# Patient Record
Sex: FEMALE | Race: BLACK OR AFRICAN AMERICAN | NOT HISPANIC OR LATINO | Employment: OTHER | URBAN - METROPOLITAN AREA
[De-identification: names, ages, dates, MRNs, and addresses within clinical notes are randomized per-mention and may not be internally consistent; named-entity substitution may affect disease eponyms.]

---

## 2024-03-16 ENCOUNTER — APPOINTMENT (OUTPATIENT)
Dept: RADIOLOGY | Facility: HOSPITAL | Age: 69
End: 2024-03-16
Payer: COMMERCIAL

## 2024-03-16 ENCOUNTER — HOSPITAL ENCOUNTER (EMERGENCY)
Facility: HOSPITAL | Age: 69
Discharge: OTHER INSTITUTION | End: 2024-03-16
Payer: COMMERCIAL

## 2024-03-16 VITALS
TEMPERATURE: 97.7 F | RESPIRATION RATE: 18 BRPM | OXYGEN SATURATION: 100 % | DIASTOLIC BLOOD PRESSURE: 73 MMHG | HEART RATE: 92 BPM | SYSTOLIC BLOOD PRESSURE: 120 MMHG

## 2024-03-16 DIAGNOSIS — L03.012 CELLULITIS OF LEFT THUMB: ICD-10-CM

## 2024-03-16 DIAGNOSIS — M79.645 THUMB PAIN, LEFT: Primary | ICD-10-CM

## 2024-03-16 LAB
ALBUMIN SERPL BCP-MCNC: 3.8 G/DL (ref 3.4–5)
ALP SERPL-CCNC: 81 U/L (ref 33–136)
ALT SERPL W P-5'-P-CCNC: 18 U/L (ref 7–45)
ANION GAP SERPL CALC-SCNC: 12 MMOL/L (ref 10–20)
AST SERPL W P-5'-P-CCNC: 22 U/L (ref 9–39)
BASOPHILS # BLD AUTO: 0.07 X10*3/UL (ref 0–0.1)
BASOPHILS NFR BLD AUTO: 0.7 %
BILIRUB SERPL-MCNC: 0.5 MG/DL (ref 0–1.2)
BUN SERPL-MCNC: 24 MG/DL (ref 6–23)
CALCIUM SERPL-MCNC: 9 MG/DL (ref 8.6–10.6)
CHLORIDE SERPL-SCNC: 116 MMOL/L (ref 98–107)
CO2 SERPL-SCNC: 18 MMOL/L (ref 21–32)
CREAT SERPL-MCNC: 1.3 MG/DL (ref 0.5–1.05)
CRP SERPL-MCNC: 1 MG/DL
EGFRCR SERPLBLD CKD-EPI 2021: 44 ML/MIN/1.73M*2
EOSINOPHIL # BLD AUTO: 0.13 X10*3/UL (ref 0–0.7)
EOSINOPHIL NFR BLD AUTO: 1.4 %
ERYTHROCYTE [DISTWIDTH] IN BLOOD BY AUTOMATED COUNT: 15.8 % (ref 11.5–14.5)
ERYTHROCYTE [SEDIMENTATION RATE] IN BLOOD BY WESTERGREN METHOD: 6 MM/H (ref 0–30)
GLUCOSE SERPL-MCNC: 98 MG/DL (ref 74–99)
HCT VFR BLD AUTO: 21.7 % (ref 36–46)
HGB BLD-MCNC: 7.7 G/DL (ref 12–16)
IMM GRANULOCYTES # BLD AUTO: 0.05 X10*3/UL (ref 0–0.7)
IMM GRANULOCYTES NFR BLD AUTO: 0.5 % (ref 0–0.9)
LYMPHOCYTES # BLD AUTO: 0.92 X10*3/UL (ref 1.2–4.8)
LYMPHOCYTES NFR BLD AUTO: 9.9 %
MAGNESIUM SERPL-MCNC: 1.84 MG/DL (ref 1.6–2.4)
MCH RBC QN AUTO: 30 PG (ref 26–34)
MCHC RBC AUTO-ENTMCNC: 35.5 G/DL (ref 32–36)
MCV RBC AUTO: 84 FL (ref 80–100)
MONOCYTES # BLD AUTO: 0.53 X10*3/UL (ref 0.1–1)
MONOCYTES NFR BLD AUTO: 5.7 %
NEUTROPHILS # BLD AUTO: 7.64 X10*3/UL (ref 1.2–7.7)
NEUTROPHILS NFR BLD AUTO: 81.8 %
NRBC BLD-RTO: 0 /100 WBCS (ref 0–0)
PLATELET # BLD AUTO: 319 X10*3/UL (ref 150–450)
POTASSIUM SERPL-SCNC: 5.1 MMOL/L (ref 3.5–5.3)
PROT SERPL-MCNC: 6.7 G/DL (ref 6.4–8.2)
RBC # BLD AUTO: 2.57 X10*6/UL (ref 4–5.2)
SODIUM SERPL-SCNC: 141 MMOL/L (ref 136–145)
WBC # BLD AUTO: 9.3 X10*3/UL (ref 4.4–11.3)

## 2024-03-16 PROCEDURE — 73130 X-RAY EXAM OF HAND: CPT | Mod: LEFT SIDE | Performed by: RADIOLOGY

## 2024-03-16 PROCEDURE — 83735 ASSAY OF MAGNESIUM: CPT

## 2024-03-16 PROCEDURE — 36415 COLL VENOUS BLD VENIPUNCTURE: CPT

## 2024-03-16 PROCEDURE — 99284 EMERGENCY DEPT VISIT MOD MDM: CPT

## 2024-03-16 PROCEDURE — 86140 C-REACTIVE PROTEIN: CPT

## 2024-03-16 PROCEDURE — 99283 EMERGENCY DEPT VISIT LOW MDM: CPT

## 2024-03-16 PROCEDURE — 85652 RBC SED RATE AUTOMATED: CPT

## 2024-03-16 PROCEDURE — 85025 COMPLETE CBC W/AUTO DIFF WBC: CPT

## 2024-03-16 PROCEDURE — 84075 ASSAY ALKALINE PHOSPHATASE: CPT

## 2024-03-16 PROCEDURE — 73130 X-RAY EXAM OF HAND: CPT | Mod: LT

## 2024-03-16 RX ORDER — ACETAMINOPHEN 325 MG/1
650 TABLET ORAL ONCE
Status: COMPLETED | OUTPATIENT
Start: 2024-03-16 | End: 2024-03-16

## 2024-03-16 RX ADMIN — ACETAMINOPHEN 650 MG: 325 TABLET ORAL at 11:01

## 2024-03-16 ASSESSMENT — COLUMBIA-SUICIDE SEVERITY RATING SCALE - C-SSRS
6. HAVE YOU EVER DONE ANYTHING, STARTED TO DO ANYTHING, OR PREPARED TO DO ANYTHING TO END YOUR LIFE?: NO
1. IN THE PAST MONTH, HAVE YOU WISHED YOU WERE DEAD OR WISHED YOU COULD GO TO SLEEP AND NOT WAKE UP?: NO
2. HAVE YOU ACTUALLY HAD ANY THOUGHTS OF KILLING YOURSELF?: NO

## 2024-03-16 NOTE — ED TRIAGE NOTES
Pt came to ED with c/o swelling in left thumb. Pt states it has been swollen x 1 week. Patient states there was a boil there and then she noticied the swelling. In triage patient's thumb is very swollen and red. Pt additionally reporting the start of swelling in right thumb. Hx Diabetes, treated with diet.

## 2024-03-16 NOTE — ED PROVIDER NOTES
HPI   Chief Complaint   Patient presents with   • Hand Pain       68-year-old female with history of HTN, DM, former CARMEN with EtOH and resultant alcoholic cirrhosis presents for chief complaint of left thumb swelling.  Denies injury.  Ongoing for 1 to 2 weeks.  Worsening now.  Endorses pain and erythema as well.  Had an abscess in the finger that was drained on its own a couple of days ago.  This thumb will not improved.  No fever chills or myalgia.  Plaints at this time.                          No data recorded                   Patient History   No past medical history on file.  No past surgical history on file.  No family history on file.  Social History     Tobacco Use   • Smoking status: Not on file   • Smokeless tobacco: Not on file   Substance Use Topics   • Alcohol use: Not on file   • Drug use: Not on file       Physical Exam   ED Triage Vitals [03/16/24 1021]   Temperature Heart Rate Respirations BP   36.5 °C (97.7 °F) 92 18 120/73      Pulse Ox Temp Source Heart Rate Source Patient Position   100 % Temporal -- --      BP Location FiO2 (%)     -- --       Physical Exam  Constitutional:       Appearance: Normal appearance.   HENT:      Head: Normocephalic and atraumatic.      Mouth/Throat:      Mouth: Mucous membranes are moist.      Pharynx: Oropharynx is clear.   Eyes:      Extraocular Movements: Extraocular movements intact.      Conjunctiva/sclera: Conjunctivae normal.      Pupils: Pupils are equal, round, and reactive to light.   Cardiovascular:      Rate and Rhythm: Normal rate and regular rhythm.      Pulses: Normal pulses.      Heart sounds: Normal heart sounds.   Pulmonary:      Effort: Pulmonary effort is normal.      Breath sounds: Normal breath sounds.   Abdominal:      General: Abdomen is flat.      Palpations: Abdomen is soft.   Musculoskeletal:         General: Normal range of motion.      Cervical back: Normal range of motion and neck supple.      Comments: Redness with swelling of the  entire first digit on the left hand with tenderness on palpation.  Entire digit is swollen but not held in passive flexion.  Pain on extension and also flexor tendon palpation.  Cap refill less than 2 seconds.  MSPs intact in all extremities.  No streaking.   Skin:     General: Skin is warm and dry.      Capillary Refill: Capillary refill takes less than 2 seconds.   Neurological:      General: No focal deficit present.      Mental Status: She is alert and oriented to person, place, and time.   Psychiatric:         Mood and Affect: Mood normal.         Behavior: Behavior normal.         Thought Content: Thought content normal.         Judgment: Judgment normal.         ED Course & MDM   Diagnoses as of 03/16/24 1314   Thumb pain, left   Cellulitis of left thumb       Medical Decision Making  Vital signs reviewed, unremarkable at this time.  Patient is well-appearing and in no apparent distress.  Speaks full sentences without difficulty.  Diagnostic testing performed.  With these findings it is concerning for possible flexor tenosynovitis.  X-rays and labs were performed and Tylenol was given for pain control to start.  Labs showed no acute findings.  X-ray showed marked soft tissue swelling in the first interphalangeal joint on the left side concerning for cellulitis.  Lucency at the dorsal lateral base of the first distal phalanx may be infectious or degenerative.  Osteomyelitis and first interphalangeal joint septic arthritis are not excluded.  I promptly tried to find the patient.  Multiple attempts were made to find her from the lobby.  However no answer.  I called all 3 of her phone numbers.  The first 2 were disconnected and the third 1 was the wrong number.  I checked the radiology waiting area as well.  I was unable to locate the patient over the course of multiple attempts spanning at least an hour.  Unfortunately this patient appears to have left without treatment complete.  Should she return, antibiotics  and Ortho consult should be considered.        Procedure  Procedures     Robb Guerra, APRN-CNP  03/16/24 6363

## 2024-03-17 ENCOUNTER — HOSPITAL ENCOUNTER (OUTPATIENT)
Facility: HOSPITAL | Age: 69
Setting detail: OBSERVATION
Discharge: HOME | End: 2024-03-18
Attending: EMERGENCY MEDICINE | Admitting: STUDENT IN AN ORGANIZED HEALTH CARE EDUCATION/TRAINING PROGRAM
Payer: COMMERCIAL

## 2024-03-17 ENCOUNTER — HOSPITAL ENCOUNTER (EMERGENCY)
Facility: HOSPITAL | Age: 69
Discharge: STILL ADMITTED OR ADMINISTRATIVE | End: 2024-03-17
Payer: COMMERCIAL

## 2024-03-17 ENCOUNTER — CLINICAL SUPPORT (OUTPATIENT)
Dept: EMERGENCY MEDICINE | Facility: HOSPITAL | Age: 69
End: 2024-03-17
Payer: COMMERCIAL

## 2024-03-17 DIAGNOSIS — E87.5 HYPERKALEMIA: ICD-10-CM

## 2024-03-17 DIAGNOSIS — L08.9 FINGER INFECTION: Primary | ICD-10-CM

## 2024-03-17 DIAGNOSIS — M10.9 ACUTE GOUT OF LEFT HAND, UNSPECIFIED CAUSE: ICD-10-CM

## 2024-03-17 DIAGNOSIS — E13.69 OTHER SPECIFIED DIABETES MELLITUS WITH OTHER SPECIFIED COMPLICATION, WITHOUT LONG-TERM CURRENT USE OF INSULIN (MULTI): ICD-10-CM

## 2024-03-17 DIAGNOSIS — I15.9 SECONDARY HYPERTENSION: ICD-10-CM

## 2024-03-17 LAB
ABO GROUP (TYPE) IN BLOOD: NORMAL
ALBUMIN SERPL BCP-MCNC: 3.9 G/DL (ref 3.4–5)
ALP SERPL-CCNC: 92 U/L (ref 33–136)
ALT SERPL W P-5'-P-CCNC: 17 U/L (ref 7–45)
ANION GAP SERPL CALC-SCNC: 14 MMOL/L (ref 10–20)
ANTIBODY SCREEN: NORMAL
AST SERPL W P-5'-P-CCNC: 21 U/L (ref 9–39)
ATRIAL RATE: 84 BPM
BASOPHILS # BLD AUTO: 0.05 X10*3/UL (ref 0–0.1)
BASOPHILS NFR BLD AUTO: 0.6 %
BILIRUB SERPL-MCNC: 0.4 MG/DL (ref 0–1.2)
BUN SERPL-MCNC: 33 MG/DL (ref 6–23)
CALCIUM SERPL-MCNC: 8.8 MG/DL (ref 8.6–10.6)
CHLORIDE SERPL-SCNC: 110 MMOL/L (ref 98–107)
CO2 SERPL-SCNC: 15 MMOL/L (ref 21–32)
CREAT SERPL-MCNC: 1.42 MG/DL (ref 0.5–1.05)
CRP SERPL-MCNC: 0.62 MG/DL
EGFRCR SERPLBLD CKD-EPI 2021: 40 ML/MIN/1.73M*2
EOSINOPHIL # BLD AUTO: 0.19 X10*3/UL (ref 0–0.7)
EOSINOPHIL NFR BLD AUTO: 2.3 %
ERYTHROCYTE [DISTWIDTH] IN BLOOD BY AUTOMATED COUNT: 16.2 % (ref 11.5–14.5)
ERYTHROCYTE [SEDIMENTATION RATE] IN BLOOD BY WESTERGREN METHOD: 3 MM/H (ref 0–30)
GLUCOSE BLD MANUAL STRIP-MCNC: 88 MG/DL (ref 74–99)
GLUCOSE SERPL-MCNC: 84 MG/DL (ref 74–99)
HCT VFR BLD AUTO: 22.8 % (ref 36–46)
HGB BLD-MCNC: 7.5 G/DL (ref 12–16)
IMM GRANULOCYTES # BLD AUTO: 0.07 X10*3/UL (ref 0–0.7)
IMM GRANULOCYTES NFR BLD AUTO: 0.8 % (ref 0–0.9)
INR PPP: 1 (ref 0.9–1.1)
LYMPHOCYTES # BLD AUTO: 1.34 X10*3/UL (ref 1.2–4.8)
LYMPHOCYTES NFR BLD AUTO: 16.2 %
MCH RBC QN AUTO: 29.4 PG (ref 26–34)
MCHC RBC AUTO-ENTMCNC: 32.9 G/DL (ref 32–36)
MCV RBC AUTO: 89 FL (ref 80–100)
MONOCYTES # BLD AUTO: 0.46 X10*3/UL (ref 0.1–1)
MONOCYTES NFR BLD AUTO: 5.5 %
NEUTROPHILS # BLD AUTO: 6.18 X10*3/UL (ref 1.2–7.7)
NEUTROPHILS NFR BLD AUTO: 74.6 %
NRBC BLD-RTO: 0 /100 WBCS (ref 0–0)
P AXIS: 58 DEGREES
P OFFSET: 186 MS
P ONSET: 137 MS
PLATELET # BLD AUTO: 345 X10*3/UL (ref 150–450)
POTASSIUM SERPL-SCNC: 4.4 MMOL/L (ref 3.5–5.3)
POTASSIUM SERPL-SCNC: 5.9 MMOL/L (ref 3.5–5.3)
PR INTERVAL: 180 MS
PROT SERPL-MCNC: 6.7 G/DL (ref 6.4–8.2)
PROTHROMBIN TIME: 11.2 SECONDS (ref 9.8–12.8)
Q ONSET: 227 MS
QRS COUNT: 14 BEATS
QRS DURATION: 74 MS
QT INTERVAL: 368 MS
QTC CALCULATION(BAZETT): 434 MS
QTC FREDERICIA: 411 MS
R AXIS: -3 DEGREES
RBC # BLD AUTO: 2.55 X10*6/UL (ref 4–5.2)
RH FACTOR (ANTIGEN D): NORMAL
SODIUM SERPL-SCNC: 133 MMOL/L (ref 136–145)
T AXIS: 55 DEGREES
T OFFSET: 411 MS
VENTRICULAR RATE: 84 BPM
WBC # BLD AUTO: 8.3 X10*3/UL (ref 4.4–11.3)

## 2024-03-17 PROCEDURE — 81003 URINALYSIS AUTO W/O SCOPE: CPT

## 2024-03-17 PROCEDURE — 84132 ASSAY OF SERUM POTASSIUM: CPT | Mod: 59 | Performed by: PHYSICIAN ASSISTANT

## 2024-03-17 PROCEDURE — 94644 CONT INHLJ TX 1ST HOUR: CPT

## 2024-03-17 PROCEDURE — 2500000002 HC RX 250 W HCPCS SELF ADMINISTERED DRUGS (ALT 637 FOR MEDICARE OP, ALT 636 FOR OP/ED): Performed by: EMERGENCY MEDICINE

## 2024-03-17 PROCEDURE — 36415 COLL VENOUS BLD VENIPUNCTURE: CPT | Performed by: PHYSICIAN ASSISTANT

## 2024-03-17 PROCEDURE — 99285 EMERGENCY DEPT VISIT HI MDM: CPT | Mod: 25 | Performed by: EMERGENCY MEDICINE

## 2024-03-17 PROCEDURE — 96365 THER/PROPH/DIAG IV INF INIT: CPT | Mod: 59 | Performed by: STUDENT IN AN ORGANIZED HEALTH CARE EDUCATION/TRAINING PROGRAM

## 2024-03-17 PROCEDURE — 85025 COMPLETE CBC W/AUTO DIFF WBC: CPT | Performed by: PHYSICIAN ASSISTANT

## 2024-03-17 PROCEDURE — 83540 ASSAY OF IRON: CPT

## 2024-03-17 PROCEDURE — 2500000002 HC RX 250 W HCPCS SELF ADMINISTERED DRUGS (ALT 637 FOR MEDICARE OP, ALT 636 FOR OP/ED): Performed by: PHYSICIAN ASSISTANT

## 2024-03-17 PROCEDURE — 4500999001 HC ED NO CHARGE

## 2024-03-17 PROCEDURE — 82436 ASSAY OF URINE CHLORIDE: CPT

## 2024-03-17 PROCEDURE — 96367 TX/PROPH/DG ADDL SEQ IV INF: CPT | Performed by: STUDENT IN AN ORGANIZED HEALTH CARE EDUCATION/TRAINING PROGRAM

## 2024-03-17 PROCEDURE — G0378 HOSPITAL OBSERVATION PER HR: HCPCS

## 2024-03-17 PROCEDURE — 84550 ASSAY OF BLOOD/URIC ACID: CPT

## 2024-03-17 PROCEDURE — 2500000005 HC RX 250 GENERAL PHARMACY W/O HCPCS: Performed by: PHYSICIAN ASSISTANT

## 2024-03-17 PROCEDURE — 93005 ELECTROCARDIOGRAM TRACING: CPT

## 2024-03-17 PROCEDURE — 85652 RBC SED RATE AUTOMATED: CPT | Performed by: PHYSICIAN ASSISTANT

## 2024-03-17 PROCEDURE — 86140 C-REACTIVE PROTEIN: CPT | Performed by: PHYSICIAN ASSISTANT

## 2024-03-17 PROCEDURE — 96375 TX/PRO/DX INJ NEW DRUG ADDON: CPT | Performed by: STUDENT IN AN ORGANIZED HEALTH CARE EDUCATION/TRAINING PROGRAM

## 2024-03-17 PROCEDURE — 85610 PROTHROMBIN TIME: CPT | Performed by: PHYSICIAN ASSISTANT

## 2024-03-17 PROCEDURE — 82728 ASSAY OF FERRITIN: CPT

## 2024-03-17 PROCEDURE — 10140 I&D HMTMA SEROMA/FLUID COLLJ: CPT

## 2024-03-17 PROCEDURE — 2500000001 HC RX 250 WO HCPCS SELF ADMINISTERED DRUGS (ALT 637 FOR MEDICARE OP): Performed by: PHYSICIAN ASSISTANT

## 2024-03-17 PROCEDURE — 80053 COMPREHEN METABOLIC PANEL: CPT | Performed by: PHYSICIAN ASSISTANT

## 2024-03-17 PROCEDURE — 86900 BLOOD TYPING SEROLOGIC ABO: CPT | Performed by: PHYSICIAN ASSISTANT

## 2024-03-17 PROCEDURE — 86901 BLOOD TYPING SEROLOGIC RH(D): CPT | Performed by: PHYSICIAN ASSISTANT

## 2024-03-17 PROCEDURE — 2500000004 HC RX 250 GENERAL PHARMACY W/ HCPCS (ALT 636 FOR OP/ED): Performed by: PHYSICIAN ASSISTANT

## 2024-03-17 PROCEDURE — 82947 ASSAY GLUCOSE BLOOD QUANT: CPT

## 2024-03-17 PROCEDURE — 99285 EMERGENCY DEPT VISIT HI MDM: CPT | Performed by: PHYSICIAN ASSISTANT

## 2024-03-17 PROCEDURE — 94645 CONT INHLJ TX EACH ADDL HOUR: CPT

## 2024-03-17 RX ORDER — LANOLIN ALCOHOL/MO/W.PET/CERES
1000 CREAM (GRAM) TOPICAL
COMMUNITY
Start: 2024-03-01 | End: 2025-03-01

## 2024-03-17 RX ORDER — ALBUTEROL SULFATE 0.83 MG/ML
10 SOLUTION RESPIRATORY (INHALATION) ONCE
Status: COMPLETED | OUTPATIENT
Start: 2024-03-17 | End: 2024-03-17

## 2024-03-17 RX ORDER — INDOMETHACIN 25 MG/1
50 CAPSULE ORAL ONCE
Status: COMPLETED | OUTPATIENT
Start: 2024-03-17 | End: 2024-03-17

## 2024-03-17 RX ORDER — LIDOCAINE HYDROCHLORIDE 10 MG/ML
5 INJECTION INFILTRATION; PERINEURAL ONCE
Status: COMPLETED | OUTPATIENT
Start: 2024-03-17 | End: 2024-03-17

## 2024-03-17 RX ORDER — HEPARIN SODIUM 5000 [USP'U]/ML
5000 INJECTION, SOLUTION INTRAVENOUS; SUBCUTANEOUS EVERY 8 HOURS
Status: DISCONTINUED | OUTPATIENT
Start: 2024-03-17 | End: 2024-03-18 | Stop reason: HOSPADM

## 2024-03-17 RX ORDER — CALCIUM GLUCONATE 20 MG/ML
2 INJECTION, SOLUTION INTRAVENOUS ONCE
Status: COMPLETED | OUTPATIENT
Start: 2024-03-17 | End: 2024-03-17

## 2024-03-17 RX ORDER — CEPHALEXIN 500 MG/1
500 CAPSULE ORAL 4 TIMES DAILY
Qty: 28 CAPSULE | Refills: 0 | Status: SHIPPED | OUTPATIENT
Start: 2024-03-17 | End: 2024-03-24

## 2024-03-17 RX ORDER — COLCHICINE 0.6 MG/1
1.2 TABLET ORAL ONCE
Status: COMPLETED | OUTPATIENT
Start: 2024-03-17 | End: 2024-03-17

## 2024-03-17 RX ORDER — METOPROLOL TARTRATE 25 MG/1
0.5 TABLET, FILM COATED ORAL 2 TIMES DAILY
COMMUNITY
Start: 2024-03-01 | End: 2024-03-18 | Stop reason: SDUPTHER

## 2024-03-17 RX ORDER — ACETAMINOPHEN 325 MG/1
975 TABLET ORAL ONCE
Status: COMPLETED | OUTPATIENT
Start: 2024-03-17 | End: 2024-03-17

## 2024-03-17 RX ORDER — CHOLECALCIFEROL (VITAMIN D3) 50 MCG
2000 TABLET ORAL
COMMUNITY
Start: 2024-03-05 | End: 2024-03-18 | Stop reason: SDUPTHER

## 2024-03-17 RX ORDER — FAMOTIDINE 20 MG/1
20 TABLET, FILM COATED ORAL 2 TIMES DAILY
COMMUNITY
Start: 2018-02-25 | End: 2024-03-18 | Stop reason: ALTCHOICE

## 2024-03-17 RX ORDER — ASPIRIN 325 MG
50000 TABLET, DELAYED RELEASE (ENTERIC COATED) ORAL WEEKLY
COMMUNITY
End: 2024-03-18 | Stop reason: ALTCHOICE

## 2024-03-17 RX ORDER — VANCOMYCIN HYDROCHLORIDE 750 MG/150ML
20 INJECTION, SOLUTION INTRAVENOUS ONCE
Status: COMPLETED | OUTPATIENT
Start: 2024-03-17 | End: 2024-03-17

## 2024-03-17 RX ORDER — LIDOCAINE 50 MG/G
1 PATCH TOPICAL DAILY
COMMUNITY
Start: 2018-06-08 | End: 2024-03-18 | Stop reason: SDUPTHER

## 2024-03-17 RX ORDER — FUROSEMIDE 20 MG/1
20 TABLET ORAL
COMMUNITY
Start: 2024-03-01 | End: 2024-03-18 | Stop reason: SDUPTHER

## 2024-03-17 RX ORDER — DEXTROSE 50 % IN WATER (D50W) INTRAVENOUS SYRINGE
25 ONCE
Status: COMPLETED | OUTPATIENT
Start: 2024-03-17 | End: 2024-03-17

## 2024-03-17 RX ORDER — COLCHICINE 0.6 MG/1
0.6 TABLET ORAL 2 TIMES DAILY
Qty: 28 TABLET | Refills: 0 | Status: SHIPPED | OUTPATIENT
Start: 2024-03-17 | End: 2024-03-31

## 2024-03-17 RX ORDER — POLYETHYLENE GLYCOL 3350 17 G/17G
17 POWDER, FOR SOLUTION ORAL DAILY
Status: DISCONTINUED | OUTPATIENT
Start: 2024-03-18 | End: 2024-03-18 | Stop reason: HOSPADM

## 2024-03-17 RX ADMIN — VANCOMYCIN HYDROCHLORIDE 1500 MG: 750 INJECTION, SOLUTION INTRAVENOUS at 18:03

## 2024-03-17 RX ADMIN — AMPICILLIN SODIUM AND SULBACTAM SODIUM 3 G: 2; 1 INJECTION, POWDER, FOR SOLUTION INTRAMUSCULAR; INTRAVENOUS at 17:16

## 2024-03-17 RX ADMIN — INSULIN HUMAN 5 UNITS: 100 INJECTION, SOLUTION PARENTERAL at 18:49

## 2024-03-17 RX ADMIN — AMPICILLIN SODIUM AND SULBACTAM SODIUM 3 G: 2; 1 INJECTION, POWDER, FOR SOLUTION INTRAMUSCULAR; INTRAVENOUS at 22:41

## 2024-03-17 RX ADMIN — ACETAMINOPHEN 975 MG: 325 TABLET ORAL at 19:29

## 2024-03-17 RX ADMIN — LIDOCAINE HYDROCHLORIDE 50 MG: 10 INJECTION, SOLUTION INFILTRATION; PERINEURAL at 16:09

## 2024-03-17 RX ADMIN — COLCHICINE 1.2 MG: 0.6 TABLET, FILM COATED ORAL at 19:29

## 2024-03-17 RX ADMIN — SODIUM BICARBONATE 50 MEQ: 84 INJECTION, SOLUTION INTRAVENOUS at 18:49

## 2024-03-17 RX ADMIN — ALBUTEROL SULFATE 10 MG: 2.5 SOLUTION RESPIRATORY (INHALATION) at 18:50

## 2024-03-17 RX ADMIN — CALCIUM GLUCONATE 2 G: 20 INJECTION, SOLUTION INTRAVENOUS at 19:02

## 2024-03-17 RX ADMIN — DEXTROSE MONOHYDRATE 25 G: 25 INJECTION, SOLUTION INTRAVENOUS at 18:48

## 2024-03-17 RX ADMIN — SODIUM ZIRCONIUM CYCLOSILICATE 10 G: 10 POWDER, FOR SUSPENSION ORAL at 19:29

## 2024-03-17 ASSESSMENT — COLUMBIA-SUICIDE SEVERITY RATING SCALE - C-SSRS
2. HAVE YOU ACTUALLY HAD ANY THOUGHTS OF KILLING YOURSELF?: NO
6. HAVE YOU EVER DONE ANYTHING, STARTED TO DO ANYTHING, OR PREPARED TO DO ANYTHING TO END YOUR LIFE?: NO
1. IN THE PAST MONTH, HAVE YOU WISHED YOU WERE DEAD OR WISHED YOU COULD GO TO SLEEP AND NOT WAKE UP?: NO

## 2024-03-17 ASSESSMENT — PAIN - FUNCTIONAL ASSESSMENT: PAIN_FUNCTIONAL_ASSESSMENT: 0-10

## 2024-03-17 ASSESSMENT — PAIN SCALES - GENERAL: PAINLEVEL_OUTOF10: 5 - MODERATE PAIN

## 2024-03-17 NOTE — DISCHARGE INSTRUCTIONS
Take the colchicine for the gout, take the Keflex for the infection overlying the gout.  You need to follow-up with a PCP as soon as possible for follow-up for the gout causing swelling of your finger.  If you have not heard from them call the number listed below.  Alternatively seeing a rheumatologist is beneficial for gout.  Need to follow-up with your PCP about your abnormal kidney function as well as your anemia and your electrolyte abnormalities

## 2024-03-17 NOTE — ED PROVIDER NOTES
HPI:  68-year-old female with history of HTN, DM no longer on meds, former substance use and alcohol use disorder presenting for left thumb swelling.  States it is minimally painful.  Was seen in the ED yesterday and had laboratory work and x-ray performed however left prior to treatment complete and there was no working phone number to contact the patient at.  States she left with the IV in place and returns today to have the IV taken out and would also like evaluated for her thumb.  She denies any change in her thumb since yesterday.  Denies any fall or any other injury.  States been swollen for 1 to 2 weeks and getting worse.  Also has some pain in her right first and second digits.  She denies any fevers chills night sweats or rigors.  Has not been to her doctor regular.  Denies tobacco or drug use, states she does drink on occasion however unclear if she has relapsed    Physical Exam:   GEN: Vitals noted. NAD, well-appearing able to comfortably  EYES:  EOMs grossly intact, anicteric sclera  JUNAID: Mucosa moist.  NECK: Supple.  CARD: RRR  PULMONARY: Moving air well. Clear all lung fields.  ABDOMEN: Soft, no guarding, no rigidity. Nontender. NABS  EXTREMITIES: Full ROM, no pitting edema, left thumb on the dorsal aspect with diffuse swelling of the distal phalanx just proximal to the nailbed with a small open lesion with purulence, diffusely tender, intact capillary fill with intact range of motion and sensation  SKIN: Intact, warm and dry  NEURO: Alert and oriented x 3, speech is clear, no obvious deficits noted.     EKG ED interpretation:  Sinus rhythm rate of 84, normal axis, normal normals, no ST segment change  ----------------------------------------------------------------------------------------------------------------------------    MDM:  68-year-old female presenting for left thumb pain and swelling x 2 weeks.  On exam she is well-appearing able to comfortably.  Vital signs stable.  Does have obvious signs  of cellulitis infection with fluid collection in the dorsal left thumb.  Evaluation from yesterday revealed no leukocytosis, anemia with H&H at 7.7/21.7, (CBC hemoglobin 11/23 8.9, 11/20 12.2); X-ray revealed diffuse swelling with lucency at the base of the distal phalanx.  Patient discussed with ED attending.  1% lidocaine without epinephrine instilled in the webspace of the first digit MCP for digital block successfully.  #11 scalpel utilized to make a approximately 4 mm linear incision at the point of maximal swelling.  Digital pressure was applied which did bring minimal purulence however large amount of bloody drainage.  After no further pressure the skin did swell again seemingly full again with most likely bloody drainage.  At this point hand surgery was consulted, will check laboratory work to see any change for him yesterday, will order MRI as recommended by radiologist from x-rays yesterday.  Spoke with Ortho hand who will see her bedside.  Kidney function with mildly elevated creatinine currently 1.42, on review of Care Everywhere she has had uptrending creatinine 3/1 1.27, 2/6 1.2, more distant past creatinine more elevated than today with 7/19/2021 at 1.57.  Potassium elevated at 5.9 without hemolysis, will perform EKG and provide hyperkalemia cocktail.  Does have anemia, minimal change from yesterday, downtrending from November.  Few years ago did have normal hemoglobin.  I did ask patient of this and she denies any known source of bleeding including any bloody, black or tarry stools.  She is told to follow-up with her PCP about this.  At the end my shift awaiting potassium recheck after hyperkalemia cocktail.  Signout given to oncoming ZEV.      ED Course as of 03/17/24 1836   Sun Mar 17, 2024   1705 HEMOGLOBIN(!): 7.5  Minimal change from yesterday [MIRIAN]   1706 WBC: 8.3  No leukocytosis currently [MIRIAN]   1810 POTASSIUM(!): 5.9  Without hemolysis, will give hyperkalemia cocktail [MIRIAN]      ED Course User  Index  [MIRIAN] Matty Apodaca PA-C         Diagnoses as of 03/17/24 1836   Finger infection   Acute gout of left hand, unspecified cause     MR hand left w and wo IV contrast    (Results Pending)     Labs Reviewed   CBC WITH AUTO DIFFERENTIAL   COMPREHENSIVE METABOLIC PANEL   SEDIMENTATION RATE, AUTOMATED   C-REACTIVE PROTEIN   PROTIME-INR   TYPE AND SCREEN       ----------------------------------------------------------------------------------------------------------------------------    This note was dictated using a speech recognition program.  While an attempt was made at proof reading to minimize errors, minor errors in transcription may be present call for questions.     Matty Apodaca PA-C  03/17/24 1833

## 2024-03-18 VITALS
HEART RATE: 74 BPM | DIASTOLIC BLOOD PRESSURE: 76 MMHG | TEMPERATURE: 97.5 F | WEIGHT: 175 LBS | OXYGEN SATURATION: 99 % | RESPIRATION RATE: 15 BRPM | SYSTOLIC BLOOD PRESSURE: 122 MMHG

## 2024-03-18 LAB
ANION GAP SERPL CALC-SCNC: 12 MMOL/L (ref 10–20)
APPEARANCE UR: CLEAR
BASOPHILS # BLD AUTO: 0.02 X10*3/UL (ref 0–0.1)
BASOPHILS NFR BLD AUTO: 0.3 %
BILIRUB UR STRIP.AUTO-MCNC: NEGATIVE MG/DL
BUN SERPL-MCNC: 32 MG/DL (ref 6–23)
CALCIUM SERPL-MCNC: 8.1 MG/DL (ref 8.6–10.6)
CHLORIDE SERPL-SCNC: 115 MMOL/L (ref 98–107)
CHLORIDE UR-SCNC: 86 MMOL/L
CHLORIDE/CREATININE (MMOL/G) IN URINE: 152 MMOL/G CREAT (ref 38–318)
CO2 SERPL-SCNC: 17 MMOL/L (ref 21–32)
COLOR UR: NORMAL
CREAT SERPL-MCNC: 1.07 MG/DL (ref 0.5–1.05)
CREAT UR-MCNC: 56.6 MG/DL (ref 20–320)
EGFRCR SERPLBLD CKD-EPI 2021: 56 ML/MIN/1.73M*2
EOSINOPHIL # BLD AUTO: 0.09 X10*3/UL (ref 0–0.7)
EOSINOPHIL NFR BLD AUTO: 1.4 %
ERYTHROCYTE [DISTWIDTH] IN BLOOD BY AUTOMATED COUNT: 15.5 % (ref 11.5–14.5)
FERRITIN SERPL-MCNC: 631 NG/ML (ref 8–150)
GLUCOSE SERPL-MCNC: 68 MG/DL (ref 74–99)
GLUCOSE UR STRIP.AUTO-MCNC: NORMAL MG/DL
HCT VFR BLD AUTO: 18.1 % (ref 36–46)
HGB BLD-MCNC: 6.5 G/DL (ref 12–16)
HOLD SPECIMEN: NORMAL
IMM GRANULOCYTES # BLD AUTO: 0.06 X10*3/UL (ref 0–0.7)
IMM GRANULOCYTES NFR BLD AUTO: 0.9 % (ref 0–0.9)
IRON SATN MFR SERPL: 15 % (ref 25–45)
IRON SERPL-MCNC: 34 UG/DL (ref 35–150)
KETONES UR STRIP.AUTO-MCNC: NEGATIVE MG/DL
LEUKOCYTE ESTERASE UR QL STRIP.AUTO: NEGATIVE
LYMPHOCYTES # BLD AUTO: 1.05 X10*3/UL (ref 1.2–4.8)
LYMPHOCYTES NFR BLD AUTO: 16.4 %
MAGNESIUM SERPL-MCNC: 1.61 MG/DL (ref 1.6–2.4)
MCH RBC QN AUTO: 29.8 PG (ref 26–34)
MCHC RBC AUTO-ENTMCNC: 35.9 G/DL (ref 32–36)
MCV RBC AUTO: 83 FL (ref 80–100)
MONOCYTES # BLD AUTO: 0.44 X10*3/UL (ref 0.1–1)
MONOCYTES NFR BLD AUTO: 6.9 %
NEUTROPHILS # BLD AUTO: 4.73 X10*3/UL (ref 1.2–7.7)
NEUTROPHILS NFR BLD AUTO: 74.1 %
NITRITE UR QL STRIP.AUTO: NEGATIVE
NRBC BLD-RTO: 0 /100 WBCS (ref 0–0)
PH UR STRIP.AUTO: 5 [PH]
PLATELET # BLD AUTO: 277 X10*3/UL (ref 150–450)
POTASSIUM SERPL-SCNC: 4.7 MMOL/L (ref 3.5–5.3)
POTASSIUM UR-SCNC: 28 MMOL/L
POTASSIUM/CREAT UR-RTO: 49 MMOL/G CREAT
PROT UR STRIP.AUTO-MCNC: NEGATIVE MG/DL
RBC # BLD AUTO: 2.18 X10*6/UL (ref 4–5.2)
RBC # UR STRIP.AUTO: NEGATIVE /UL
SODIUM SERPL-SCNC: 139 MMOL/L (ref 136–145)
SODIUM UR-SCNC: 90 MMOL/L
SODIUM/CREAT UR-RTO: 159 MMOL/G CREAT
SP GR UR STRIP.AUTO: 1.01
TIBC SERPL-MCNC: 232 UG/DL (ref 240–445)
UIBC SERPL-MCNC: 198 UG/DL (ref 110–370)
URATE SERPL-MCNC: 11.8 MG/DL (ref 2.3–6.7)
UROBILINOGEN UR STRIP.AUTO-MCNC: NORMAL MG/DL
WBC # BLD AUTO: 6.4 X10*3/UL (ref 4.4–11.3)

## 2024-03-18 PROCEDURE — 80048 BASIC METABOLIC PNL TOTAL CA: CPT

## 2024-03-18 PROCEDURE — 2500000004 HC RX 250 GENERAL PHARMACY W/ HCPCS (ALT 636 FOR OP/ED): Performed by: PHYSICIAN ASSISTANT

## 2024-03-18 PROCEDURE — 2500000001 HC RX 250 WO HCPCS SELF ADMINISTERED DRUGS (ALT 637 FOR MEDICARE OP)

## 2024-03-18 PROCEDURE — G0378 HOSPITAL OBSERVATION PER HR: HCPCS

## 2024-03-18 PROCEDURE — 83735 ASSAY OF MAGNESIUM: CPT

## 2024-03-18 PROCEDURE — 36415 COLL VENOUS BLD VENIPUNCTURE: CPT

## 2024-03-18 PROCEDURE — 99239 HOSP IP/OBS DSCHRG MGMT >30: CPT | Performed by: STUDENT IN AN ORGANIZED HEALTH CARE EDUCATION/TRAINING PROGRAM

## 2024-03-18 PROCEDURE — 85025 COMPLETE CBC W/AUTO DIFF WBC: CPT

## 2024-03-18 PROCEDURE — 2500000004 HC RX 250 GENERAL PHARMACY W/ HCPCS (ALT 636 FOR OP/ED)

## 2024-03-18 PROCEDURE — 96361 HYDRATE IV INFUSION ADD-ON: CPT | Performed by: STUDENT IN AN ORGANIZED HEALTH CARE EDUCATION/TRAINING PROGRAM

## 2024-03-18 RX ORDER — ASCORBIC ACID 500 MG
500 TABLET ORAL DAILY
COMMUNITY
End: 2024-03-18 | Stop reason: SDUPTHER

## 2024-03-18 RX ORDER — FUROSEMIDE 40 MG/1
20 TABLET ORAL DAILY
Status: DISCONTINUED | OUTPATIENT
Start: 2024-03-18 | End: 2024-03-18 | Stop reason: HOSPADM

## 2024-03-18 RX ORDER — ASCORBIC ACID 500 MG
500 TABLET ORAL DAILY
Qty: 30 TABLET | Refills: 0 | Status: SHIPPED | OUTPATIENT
Start: 2024-03-18 | End: 2024-04-17

## 2024-03-18 RX ORDER — CHOLECALCIFEROL (VITAMIN D3) 50 MCG
2000 TABLET ORAL
Qty: 30 TABLET | Refills: 0 | Status: SHIPPED | OUTPATIENT
Start: 2024-03-18 | End: 2024-04-17

## 2024-03-18 RX ORDER — METOPROLOL TARTRATE 25 MG/1
12.5 TABLET, FILM COATED ORAL 2 TIMES DAILY
Qty: 30 TABLET | Refills: 0 | Status: SHIPPED | OUTPATIENT
Start: 2024-03-18 | End: 2024-04-17

## 2024-03-18 RX ORDER — LANOLIN ALCOHOL/MO/W.PET/CERES
100 CREAM (GRAM) TOPICAL
Qty: 30 TABLET | Refills: 0 | Status: SHIPPED | OUTPATIENT
Start: 2024-03-18 | End: 2024-04-17

## 2024-03-18 RX ORDER — MULTIVIT-MIN/IRON FUM/FOLIC AC 7.5 MG-4
1 TABLET ORAL DAILY
Qty: 30 TABLET | Refills: 0 | Status: SHIPPED | OUTPATIENT
Start: 2024-03-18 | End: 2024-04-17

## 2024-03-18 RX ORDER — ACETAMINOPHEN 500 MG
1000 TABLET ORAL EVERY 6 HOURS PRN
Qty: 30 TABLET | Refills: 0 | Status: SHIPPED | OUTPATIENT
Start: 2024-03-18

## 2024-03-18 RX ORDER — LANOLIN ALCOHOL/MO/W.PET/CERES
1000 CREAM (GRAM) TOPICAL DAILY
Status: DISCONTINUED | OUTPATIENT
Start: 2024-03-18 | End: 2024-03-18 | Stop reason: HOSPADM

## 2024-03-18 RX ORDER — CHOLECALCIFEROL (VITAMIN D3) 25 MCG
2000 TABLET ORAL DAILY
Status: DISCONTINUED | OUTPATIENT
Start: 2024-03-18 | End: 2024-03-18 | Stop reason: HOSPADM

## 2024-03-18 RX ORDER — SPIRONOLACTONE 25 MG/1
25 TABLET ORAL
COMMUNITY
Start: 2024-03-01 | End: 2024-03-18 | Stop reason: SDUPTHER

## 2024-03-18 RX ORDER — LIDOCAINE 50 MG/G
1 PATCH TOPICAL DAILY
Qty: 5 PATCH | Refills: 0 | Status: SHIPPED | OUTPATIENT
Start: 2024-03-18 | End: 2024-04-17

## 2024-03-18 RX ORDER — SPIRONOLACTONE 25 MG/1
25 TABLET ORAL
Qty: 30 TABLET | Refills: 0 | Status: SHIPPED | OUTPATIENT
Start: 2024-03-18 | End: 2024-04-17

## 2024-03-18 RX ORDER — METOPROLOL TARTRATE 50 MG/1
25 TABLET ORAL 2 TIMES DAILY
Status: DISCONTINUED | OUTPATIENT
Start: 2024-03-18 | End: 2024-03-18 | Stop reason: HOSPADM

## 2024-03-18 RX ORDER — MULTIVIT-MIN/IRON FUM/FOLIC AC 7.5 MG-4
1 TABLET ORAL DAILY
COMMUNITY
End: 2024-03-18 | Stop reason: SDUPTHER

## 2024-03-18 RX ORDER — LIDOCAINE 560 MG/1
1 PATCH PERCUTANEOUS; TOPICAL; TRANSDERMAL DAILY
Status: DISCONTINUED | OUTPATIENT
Start: 2024-03-18 | End: 2024-03-18 | Stop reason: HOSPADM

## 2024-03-18 RX ORDER — LACTULOSE 10 G/15ML
15 SOLUTION ORAL 3 TIMES DAILY PRN
COMMUNITY

## 2024-03-18 RX ORDER — FAMOTIDINE 40 MG/1
20 TABLET, FILM COATED ORAL DAILY
Status: DISCONTINUED | OUTPATIENT
Start: 2024-03-18 | End: 2024-03-18 | Stop reason: HOSPADM

## 2024-03-18 RX ORDER — LIDOCAINE 560 MG/1
1 PATCH PERCUTANEOUS; TOPICAL; TRANSDERMAL DAILY
Qty: 10 PATCH | Refills: 0 | Status: SHIPPED | OUTPATIENT
Start: 2024-03-19 | End: 2024-04-18

## 2024-03-18 RX ORDER — LANOLIN ALCOHOL/MO/W.PET/CERES
100 CREAM (GRAM) TOPICAL
COMMUNITY
Start: 2018-06-08 | End: 2024-03-18 | Stop reason: SDUPTHER

## 2024-03-18 RX ORDER — FUROSEMIDE 20 MG/1
20 TABLET ORAL
Qty: 30 TABLET | Refills: 0 | Status: SHIPPED | OUTPATIENT
Start: 2024-03-18 | End: 2024-04-17

## 2024-03-18 RX ADMIN — AMPICILLIN SODIUM AND SULBACTAM SODIUM 3 G: 2; 1 INJECTION, POWDER, FOR SOLUTION INTRAMUSCULAR; INTRAVENOUS at 04:35

## 2024-03-18 RX ADMIN — Medication 2000 UNITS: at 09:44

## 2024-03-18 RX ADMIN — SODIUM CHLORIDE 1000 ML: 9 INJECTION, SOLUTION INTRAVENOUS at 02:19

## 2024-03-18 RX ADMIN — FAMOTIDINE 20 MG: 40 TABLET ORAL at 09:44

## 2024-03-18 RX ADMIN — FUROSEMIDE 20 MG: 40 TABLET ORAL at 09:44

## 2024-03-18 RX ADMIN — METOPROLOL TARTRATE 25 MG: 50 TABLET, FILM COATED ORAL at 09:43

## 2024-03-18 RX ADMIN — CYANOCOBALAMIN TAB 1000 MCG 1000 MCG: 1000 TAB at 09:44

## 2024-03-18 ASSESSMENT — PAIN SCALES - GENERAL: PAINLEVEL_OUTOF10: 1

## 2024-03-18 ASSESSMENT — PAIN DESCRIPTION - PROGRESSION: CLINICAL_PROGRESSION: RAPIDLY IMPROVING

## 2024-03-18 ASSESSMENT — PAIN DESCRIPTION - PAIN TYPE: TYPE: ACUTE PAIN

## 2024-03-18 ASSESSMENT — PAIN - FUNCTIONAL ASSESSMENT: PAIN_FUNCTIONAL_ASSESSMENT: 0-10

## 2024-03-18 NOTE — PROGRESS NOTES
Pharmacy Medication History Review    Deena Dixon is a 68 y.o. female admitted for Finger infection. Pharmacy reviewed the patient's [id number] medications and allergies for accuracy.    The list below reflects the updated PTA list. Comments regarding how patient may be taking medications differently can be found in the Admit Orders Activity  Prior to Admission Medications   Prescriptions Last Dose Informant Patient Reported? Taking?   ascorbic acid (Vitamin C) 500 mg tablet 3/17/2024 at morning Self Yes Yes   Sig: Take 1 tablet (500 mg) by mouth once daily.   cholecalciferol (Vitamin D-3) 50 MCG (2000 UT) tablet 3/17/2024 at morning Self Yes Yes   Sig: Take 1 tablet (2,000 Units) by mouth once daily.   cyanocobalamin (Vitamin B-12) 1,000 mcg tablet 3/17/2024 at morning Self Yes Yes   Sig: Take 1 tablet (1,000 mcg) by mouth once daily.   furosemide (Lasix) 20 mg tablet 3/17/2024 at morning Self Yes Yes   Sig: Take 1 tablet (20 mg) by mouth once daily.   lactulose 10 gram/15 mL (15 mL) solution 3/17/2024 Self Yes Yes   Sig: Take 15 mL by mouth 3 times a day as needed (constipationn).   lidocaine (Lidoderm) 5 % patch   Yes    Sig: Place 1 patch on the skin once daily.   metoprolol tartrate (Lopressor) 25 mg tablet 3/17/2024 at morning; patient has been taking 1 tab BID Self Yes Yes   Sig: Take 0.5 tablets (12.5 mg) by mouth 2 times a day.   multivitamin with minerals tablet 3/17/2024 at morning Self Yes Yes   Sig: Take 1 tablet by mouth once daily.   spironolactone (Aldactone) 25 mg tablet 3/17/2024 at morning Self Yes Yes   Sig: Take 1 tablet (25 mg) by mouth once daily.   thiamine 100 mg tablet 3/17/2024 at morning Self Yes Yes   Sig: Take 1 tablet (100 mg) by mouth once daily.      Facility-Administered Medications: None        The list below reflects the updated allergy list. Please review each documented allergy for additional clarification and justification.  Allergies  Reviewed by Shania Moss,  PharmD on 3/18/2024   No Known Allergies         Patient accepts M2B at discharge. Pharmacy has been updated to [facility].    Sources used to complete the med history include   Patient Interview - moderate historian able to confirm medications and directions for administration from verbally presented list  Care Everywhere [facility] Clinical Summary generated 3/17/2024  Care Everywhere [facility] 2/6/24 [facility] Annual Wellness Visit (author: Carmen)  OARRS - none  EPIC medication dispense report    Below are additional concerns with the patient's PTA list.  Patient manages own medication administration, while  sets up pill box  Per 2/6/24 [facility] note, patient had not taken medications for almost 1 year prior to appointment  Patient denies taking any medications for heartburn (famotidine), dicyclomine, and Abilify    Shania Moss, PharmD   Transitions of Care Pharmacist  [facility]  Please reach out via Secure Chat for questions, or if no response call [employer] or [employer]

## 2024-03-18 NOTE — H&P
History Of Present Illness  Deena Dixon is a 68 y.o. female presenting with left thumb pain and swelling x 2 weeks. No prior trauma S/p bedside I&D x2 in ED 03/16 without improvement, reportedly drained pus. Left AMA without abx. Presented today with left thumb pain.  denies any prior trauma to the thumb. Hand surgery consulted the ED recommend to start antibiotics and no acute intervention. Pt denies fever or chills,denies dizziness, denies chest pain or SOB, denies urinary symptoms.        ED course:  Cbc:WBC 9.3.   ESR 6, CRP 1.0    Hand xray:  1. Marked soft tissue swelling about the 1st interphalangeal joint is  concerning for cellulitis. Lucency at the dorsal lateral base of the  1st distal phalanx may be infectious or degenerative. Osteomyelitis  and 1st interphalangeal joint septic arthritis are not excluded. If  deemed clinically appropriate, MRI could be considered for further  evaluation.      2. Arthritic/degenerative changes at other joints of the left hand.       EKG ED interpretation:  Sinus rhythm rate of 84, normal axis, normal normals, no ST segment change    ED intervention   -I&D   -Hype K cocktail   -s/p vancomycin       Past Medical History  No past medical history on file.    Surgical History  No past surgical history on file.     Social History  She has no history on file for tobacco use, alcohol use, and drug use.    Family History  No family history on file.     Allergies  Patient has no known allergies.    Review of Systems   All other systems reviewed and are negative.       Physical Exam   GEN: Vitals noted. NAD, well-appearing able to comfortably  EYES:  EOMs grossly intact, anicteric sclera  JUNAID: Mucosa moist.  NECK: Supple.  CARD: RRR  PULMONARY: Moving air well. Clear all lung fields.  ABDOMEN: Soft, no guarding, no rigidity. Nontender. NABS  EXTREMITIES: Full ROM, no pitting edema, left thumb on the dorsal aspect with diffuse swelling of the distal phalanx just proximal to the  nailbed with a small open lesion with purulence, diffusely tender, intact capillary fill with intact range of motion and sensation  SKIN: Intact, warm and dry  NEURO: Alert and oriented x 3, speech is clear, no obvious deficits noted.     Last Recorded Vitals  Blood pressure 130/85, pulse (!) 105, temperature 36.4 °C (97.5 °F), resp. rate 16, weight 79.4 kg (175 lb), SpO2 99 %.    Relevant Results  Results for orders placed or performed during the hospital encounter of 03/17/24 (from the past 24 hour(s))   CBC and Auto Differential   Result Value Ref Range    WBC 8.3 4.4 - 11.3 x10*3/uL    nRBC 0.0 0.0 - 0.0 /100 WBCs    RBC 2.55 (L) 4.00 - 5.20 x10*6/uL    Hemoglobin 7.5 (L) 12.0 - 16.0 g/dL    Hematocrit 22.8 (L) 36.0 - 46.0 %    MCV 89 80 - 100 fL    MCH 29.4 26.0 - 34.0 pg    MCHC 32.9 32.0 - 36.0 g/dL    RDW 16.2 (H) 11.5 - 14.5 %    Platelets 345 150 - 450 x10*3/uL    Neutrophils % 74.6 40.0 - 80.0 %    Immature Granulocytes %, Automated 0.8 0.0 - 0.9 %    Lymphocytes % 16.2 13.0 - 44.0 %    Monocytes % 5.5 2.0 - 10.0 %    Eosinophils % 2.3 0.0 - 6.0 %    Basophils % 0.6 0.0 - 2.0 %    Neutrophils Absolute 6.18 1.20 - 7.70 x10*3/uL    Immature Granulocytes Absolute, Automated 0.07 0.00 - 0.70 x10*3/uL    Lymphocytes Absolute 1.34 1.20 - 4.80 x10*3/uL    Monocytes Absolute 0.46 0.10 - 1.00 x10*3/uL    Eosinophils Absolute 0.19 0.00 - 0.70 x10*3/uL    Basophils Absolute 0.05 0.00 - 0.10 x10*3/uL   Comprehensive metabolic panel   Result Value Ref Range    Glucose 84 74 - 99 mg/dL    Sodium 133 (L) 136 - 145 mmol/L    Potassium 5.9 (H) 3.5 - 5.3 mmol/L    Chloride 110 (H) 98 - 107 mmol/L    Bicarbonate 15 (L) 21 - 32 mmol/L    Anion Gap 14 10 - 20 mmol/L    Urea Nitrogen 33 (H) 6 - 23 mg/dL    Creatinine 1.42 (H) 0.50 - 1.05 mg/dL    eGFR 40 (L) >60 mL/min/1.73m*2    Calcium 8.8 8.6 - 10.6 mg/dL    Albumin 3.9 3.4 - 5.0 g/dL    Alkaline Phosphatase 92 33 - 136 U/L    Total Protein 6.7 6.4 - 8.2 g/dL    AST 21 9 -  39 U/L    Bilirubin, Total 0.4 0.0 - 1.2 mg/dL    ALT 17 7 - 45 U/L   Sedimentation Rate   Result Value Ref Range    Sedimentation Rate 3 0 - 30 mm/h   C-Reactive Protein   Result Value Ref Range    C-Reactive Protein 0.62 <1.00 mg/dL   Protime-INR   Result Value Ref Range    Protime 11.2 9.8 - 12.8 seconds    INR 1.0 0.9 - 1.1   Type And Screen   Result Value Ref Range    ABO TYPE O     Rh TYPE POS     ANTIBODY SCREEN NEG    ECG 12 lead   Result Value Ref Range    Ventricular Rate 84 BPM    Atrial Rate 84 BPM    FL Interval 180 ms    QRS Duration 74 ms    QT Interval 368 ms    QTC Calculation(Bazett) 434 ms    P Axis 58 degrees    R Axis -3 degrees    T Axis 55 degrees    QRS Count 14 beats    Q Onset 227 ms    P Onset 137 ms    P Offset 186 ms    T Offset 411 ms    QTC Fredericia 411 ms   POCT GLUCOSE   Result Value Ref Range    POCT Glucose 88 74 - 99 mg/dL   Potassium   Result Value Ref Range    Potassium 4.4 3.5 - 5.3 mmol/L               Assessment/Plan   68-year-old female presenting for left thumb pain and swelling x 2 weeks. On exam she is well-appearing able to comfortably. Vital signs stable. Have sings of left thumb cellulitis. Found to have worsening creatinine and potasium to 5.9. repeater after potasium cocktail 4.4. will admit for further evaluation.     #Left thumb cellulitis   -left thumb pain and swelling x 2 weeks.   -I&D*2 showed bloody drainage   -CRP ESR WNL   -hand surgery consulted no acute intervention   -recommend start the pt on unasyn and discharge on keflex     #KAILEE   #Hypokalemia   -Kidney function with mildly elevated creatinine currently 1.42, creatinine 3/1 1.27, 2/6 1.2, more distant past creatinine more elevated than today with 7/19/2021 at 1.57.   - K:5.9without hemolysis, trend down to 4.4 after hyperkalemia cocktail.   -EKG sinus with no T wave abnormalities   -1L IVF n/s 100cc/hr   -consider nephrology consult in the am     #Anemia   -denies any known source of bleeding  including any bloody, black or tarry stools.   -no source of bleeding likely 2/2 to worsening kidney function test or iron deficiency anemia   -Iron panel pending   -will monitor H&H     Code status full code     I spent 60 minutes in the professional and overall care of this patient.      Diogenes Park MD

## 2024-03-18 NOTE — PROGRESS NOTES
Patient was handed off to me from the previous team. For full history, physical, and prior ED course, please see previous provider note prior to patient handoff. This is an addendum to the record.    Briefly, this is a 68-year-old female with history of alcoholic cirrhosis (sober since diagnosis in 2018), who presents to the emergency department for 1 to 2 weeks of left thumb swelling.  Attempted aspiration in the ED and by orthopedic surgery were unsuccessful.  Most likely secondary to gout, plan for outpatient antibiotics.  Incidentally found to have worsening renal function with hyperkalemia to 5.9 and worsening anemia to 7.5 on labs today.  Receiving K cocktail and pending repeat potassium level for disposition at time of signout.    Labs Reviewed   CBC WITH AUTO DIFFERENTIAL - Abnormal       Result Value    WBC 8.3      nRBC 0.0      RBC 2.55 (*)     Hemoglobin 7.5 (*)     Hematocrit 22.8 (*)     MCV 89      MCH 29.4      MCHC 32.9      RDW 16.2 (*)     Platelets 345      Neutrophils % 74.6      Immature Granulocytes %, Automated 0.8      Lymphocytes % 16.2      Monocytes % 5.5      Eosinophils % 2.3      Basophils % 0.6      Neutrophils Absolute 6.18      Immature Granulocytes Absolute, Automated 0.07      Lymphocytes Absolute 1.34      Monocytes Absolute 0.46      Eosinophils Absolute 0.19      Basophils Absolute 0.05     COMPREHENSIVE METABOLIC PANEL - Abnormal    Glucose 84      Sodium 133 (*)     Potassium 5.9 (*)     Chloride 110 (*)     Bicarbonate 15 (*)     Anion Gap 14      Urea Nitrogen 33 (*)     Creatinine 1.42 (*)     eGFR 40 (*)     Calcium 8.8      Albumin 3.9      Alkaline Phosphatase 92      Total Protein 6.7      AST 21      Bilirubin, Total 0.4      ALT 17     CBC WITH AUTO DIFFERENTIAL - Abnormal    WBC 6.4      nRBC 0.0      RBC 2.18 (*)     Hemoglobin 6.5 (*)     Hematocrit 18.1 (*)     MCV 83      MCH 29.8      MCHC 35.9      RDW 15.5 (*)     Platelets 277      Neutrophils % 74.1       Immature Granulocytes %, Automated 0.9      Lymphocytes % 16.4      Monocytes % 6.9      Eosinophils % 1.4      Basophils % 0.3      Neutrophils Absolute 4.73      Immature Granulocytes Absolute, Automated 0.06      Lymphocytes Absolute 1.05 (*)     Monocytes Absolute 0.44      Eosinophils Absolute 0.09      Basophils Absolute 0.02     BASIC METABOLIC PANEL - Abnormal    Glucose 68 (*)     Sodium 139      Potassium 4.7      Chloride 115 (*)     Bicarbonate 17 (*)     Anion Gap 12      Urea Nitrogen 32 (*)     Creatinine 1.07 (*)     eGFR 56 (*)     Calcium 8.1 (*)    IRON AND TIBC - Abnormal    Iron 34 (*)     UIBC 198      TIBC 232 (*)     % Saturation 15 (*)    FERRITIN - Abnormal    Ferritin 631 (*)    URIC ACID - Abnormal    Uric Acid 11.8 (*)    SEDIMENTATION RATE, AUTOMATED - Normal    Sedimentation Rate 3     C-REACTIVE PROTEIN - Normal    C-Reactive Protein 0.62     PROTIME-INR - Normal    Protime 11.2      INR 1.0     POTASSIUM - Normal    Potassium 4.4     URINALYSIS WITH REFLEX MICROSCOPIC - Normal    Color, Urine Light-Yellow      Appearance, Urine Clear      Specific Gravity, Urine 1.014      pH, Urine 5.0      Protein, Urine NEGATIVE      Glucose, Urine Normal      Blood, Urine NEGATIVE      Ketones, Urine NEGATIVE      Bilirubin, Urine NEGATIVE      Urobilinogen, Urine Normal      Nitrite, Urine NEGATIVE      Leukocyte Esterase, Urine NEGATIVE     MAGNESIUM - Normal    Magnesium 1.61     POCT GLUCOSE - Normal    POCT Glucose 88     TYPE AND SCREEN    ABO TYPE O      Rh TYPE POS      ANTIBODY SCREEN NEG     ELECTROLYTE PANEL, URINE    Sodium, Urine Random 90      Sodium/Creatinine Ratio 159      Potassium, Urine Random 28      Potassium/Creatinine Ratio 49      Chloride, Urine Random 86      Chloride/Creatinine Ratio 152      Creatinine, Urine Random 56.6     URINE GRAY TUBE    Extra Tube Hold for add-ons.     POCT GLUCOSE METER         Hospital Course/MDM:  I reviewed the patient's records from  [facility].  Appears to have gradual worsening of renal function on outside lab work, with potassium of 5.4 last month.  Has had chronic anemia as well which has progressed from 8.9 4 months ago to 7.5 today.  Creatinine is uptrending as well, is now 1.4 from 1.1.  Overall concerning for renal dysfunction and potentially anemia of renal disease.  There are multiple documented telephone encounters trying to arrange outpatient follow-up, but patient has been yet to follow-up.  Given this, I do believe she would benefit from admission to be evaluated by nephrology, even if her potassium intervals appropriately.    I discussed this with the patient at bedside, she is agreeable for admission for further workup and management.  Repeat potassium after treatments is 4.4.  Mildly tachycardic on reevaluation, may be secondary to albuterol treatments, blood pressure is stable.  Considered alcohol withdrawal, but reports no active alcohol use, is not anxious or tremulous.  Discussed with admissions coordinator, will be admitted to medicine for further management.    Disposition:  Admit to medicine, telemetry    --  Tam Woodson MD  Emergency Medicine, PGY-3

## 2024-03-18 NOTE — DISCHARGE SUMMARY
INTERNAL MEDICINE DISCHARGE SUMMARY             Discharge Diagnosis   Finger infection    Issues Requiring Follow-Up   Multiple chronic medical conditions in the PCP    Test Results Pending At Discharge     Pending Labs       Order Current Status    Extra Tubes In process    Urine Magallon Tube In process          Hospital Course   68-year-old female presenting for left thumb pain and swelling x 2 weeks. On exam, dorsal incision over thumb with small amount of expressible exudate. Fluctuance over dorsal aspect. NVI. XRs wo acute inj. ESR 6, CRP 1.0, WBC 9.3. Found to have worsening creatinine and potasium to 5.9. repeated after potasium cocktail 4.4.  Surgery was consulted, repeat I&D at beside with gouty exudate and kirsty blood. Low c/f infection. Unasyn x1.  Hand surgery recommendation was to follow-up with Dr. Badillo a week.  Patient also does not have PCP, will refer to PCP upon discharge.  Patient has multiple comorbidities including CHF diabetes and medication noncompliance, patient will benefit greatly from healthy at home program.    Pertinent Physical Exam At Time of Discharge     Physical Exam  Gen: AOx3, NAD  HEENT: normocephalic atraumatic  Psych: appropriate mood and affect  Resp: nonlabored breathing  Cardiac: Extremities WWP, RRR to peripheral palpation  Neuro: CN 2-12 grossly intact  Skin: no rashes    Home Medications        Medication List      START taking these medications     acetaminophen 500 mg tablet; Commonly known as: Tylenol; Take 2 tablets   (1,000 mg) by mouth every 6 hours if needed for mild pain (1 - 3).   cephalexin 500 mg capsule; Commonly known as: Keflex; Take 1 capsule   (500 mg) by mouth 4 times a day for 7 days.   colchicine 0.6 mg tablet; Take 1 tablet (0.6 mg) by mouth 2 times a day   for 14 days.     CHANGE how you take these medications     * lidocaine 5 % patch; Commonly known as: Lidoderm; Place 1 patch over   12 hours on the  skin once daily.; What changed: Another medication with   the same name was added. Make sure you understand how and when to take   each.   * lidocaine 4 % patch; Place 1 patch over 12 hours on the skin once   daily. Remove & discard patch within 12 hours or as directed by MD. Do not   start before March 19, 2024.; Start taking on: March 19, 2024; What   changed: You were already taking a medication with the same name, and this   prescription was added. Make sure you understand how and when to take   each.  * This list has 2 medication(s) that are the same as other medications   prescribed for you. Read the directions carefully, and ask your doctor or   other care provider to review them with you.     CONTINUE taking these medications     ascorbic acid 500 mg tablet; Commonly known as: Vitamin C; Take 1 tablet   (500 mg) by mouth once daily.   cholecalciferol 50 MCG (2000 UT) tablet; Commonly known as: Vitamin D-3;   Take 1 tablet (2,000 Units) by mouth once daily.   cyanocobalamin 1,000 mcg tablet; Commonly known as: Vitamin B-12   furosemide 20 mg tablet; Commonly known as: Lasix; Take 1 tablet (20 mg)   by mouth once daily.   lactulose 10 gram/15 mL (15 mL) solution   metoprolol tartrate 25 mg tablet; Commonly known as: Lopressor; Take 0.5   tablets (12.5 mg) by mouth 2 times a day.   multivitamin with minerals tablet; Take 1 tablet by mouth once daily.   spironolactone 25 mg tablet; Commonly known as: Aldactone; Take 1 tablet   (25 mg) by mouth once daily.   thiamine 100 mg tablet; Commonly known as: Vitamin B-1; Take 1 tablet   (100 mg) by mouth once daily.     Outpatient Follow-Up     Future Appointments   Date Time Provider Department Center   5/21/2024 11:00 AM Bart Krause MD [id number]       Brayan Dorantes MD

## 2025-04-09 NOTE — CONSULTS
Orthopaedic Surgery Consult H&P    HPI:   L thumb gout    68F (DM2, alcoholic cirrhosis) pw above for 1-2 weeks. No prior injury. S/p bedside I&D x2 in ED without improvement, reportedly drained pus. Left AMA without abx.     PMH: per above/EMR  PSH: per above/EMR  SocHx:      -  Denies tobacco use      -  Denies EtOH use      -  Denies other drug use  FamHx:  Non-contributory to this patient's acute orthopaedic problem.   Allergies: Reviewed in EMR  Meds: Reviewed in EMR    ROS      - 14 point ROS negative except as above    Physical Exam:  Gen: AOx3, NAD  HEENT: normocephalic atraumatic  Psych: appropriate mood and affect  Resp: nonlabored breathing  Cardiac: Extremities WWP, RRR to peripheral palpation  Neuro: CN 2-12 grossly intact  Skin: no rashes    Left Hand:  - dorsal incision over thumb with small amount of expressible exudate  - Fluctuance over dorsal aspect  - SILT M/U/R, radial/ulnar thumb  - Fires AIN/PIN/Ulnar distributions  - Fingertips pink/warm, cap refill < 2sec  - 2+ radial pulse  - Minimal pain with passive stretch of fingers  - Hand and Forearm compartments compressible  - No fusiform digital swelling noted    A full secondary exam was performed and all relevant findings discussed and noted above.    Results for orders placed or performed during the hospital encounter of 03/17/24 (from the past 24 hour(s))   CBC and Auto Differential   Result Value Ref Range    WBC 8.3 4.4 - 11.3 x10*3/uL    nRBC 0.0 0.0 - 0.0 /100 WBCs    RBC 2.55 (L) 4.00 - 5.20 x10*6/uL    Hemoglobin 7.5 (L) 12.0 - 16.0 g/dL    Hematocrit 22.8 (L) 36.0 - 46.0 %    MCV 89 80 - 100 fL    MCH 29.4 26.0 - 34.0 pg    MCHC 32.9 32.0 - 36.0 g/dL    RDW 16.2 (H) 11.5 - 14.5 %    Platelets 345 150 - 450 x10*3/uL    Neutrophils % 74.6 40.0 - 80.0 %    Immature Granulocytes %, Automated 0.8 0.0 - 0.9 %    Lymphocytes % 16.2 13.0 - 44.0 %    Monocytes % 5.5 2.0 - 10.0 %    Eosinophils % 2.3 0.0 - 6.0 %    Basophils % 0.6 0.0 - 2.0 %     Neutrophils Absolute 6.18 1.20 - 7.70 x10*3/uL    Immature Granulocytes Absolute, Automated 0.07 0.00 - 0.70 x10*3/uL    Lymphocytes Absolute 1.34 1.20 - 4.80 x10*3/uL    Monocytes Absolute 0.46 0.10 - 1.00 x10*3/uL    Eosinophils Absolute 0.19 0.00 - 0.70 x10*3/uL    Basophils Absolute 0.05 0.00 - 0.10 x10*3/uL   Comprehensive metabolic panel   Result Value Ref Range    Glucose 84 74 - 99 mg/dL    Sodium 133 (L) 136 - 145 mmol/L    Potassium 5.9 (H) 3.5 - 5.3 mmol/L    Chloride 110 (H) 98 - 107 mmol/L    Bicarbonate 15 (L) 21 - 32 mmol/L    Anion Gap 14 10 - 20 mmol/L    Urea Nitrogen 33 (H) 6 - 23 mg/dL    Creatinine 1.42 (H) 0.50 - 1.05 mg/dL    eGFR 40 (L) >60 mL/min/1.73m*2    Calcium 8.8 8.6 - 10.6 mg/dL    Albumin 3.9 3.4 - 5.0 g/dL    Alkaline Phosphatase 92 33 - 136 U/L    Total Protein 6.7 6.4 - 8.2 g/dL    AST 21 9 - 39 U/L    Bilirubin, Total 0.4 0.0 - 1.2 mg/dL    ALT 17 7 - 45 U/L   Sedimentation Rate   Result Value Ref Range    Sedimentation Rate 3 0 - 30 mm/h   C-Reactive Protein   Result Value Ref Range    C-Reactive Protein 0.62 <1.00 mg/dL   Protime-INR   Result Value Ref Range    Protime 11.2 9.8 - 12.8 seconds    INR 1.0 0.9 - 1.1   Type And Screen   Result Value Ref Range    ABO TYPE O     Rh TYPE POS     ANTIBODY SCREEN NEG        No orders to display       Assessment:  L thumb gout    68F (DM2, alcoholic cirrhosis) pw above for 1-2 weeks. No prior injury. S/p bedside I&D x2 in ED without improvement, reportedly drained pus. Left AMA without abx. On exam, dorsal incision over thumb with small amount of expressible exudate. Fluctuance over dorsal aspect. NVI. XRs wo acute inj. ESR 6, CRP 1.0, WBC 9.3. Repeat I&D at beside with gouty exudate and kirsty blood. Low c/f infection. Unasyn x1.  Plan:     Plan:  - No acute intervention  - Keflex 1 week  - FU w Dr Badillo in 1 week   - FU w PCP in 1 week  - WB: WBAT  - Diet: regular         Ky Butterfield MD  On Call Resident - PGY-2 Orthopedic  Surgery  [facility]  Epic Message Preferred  Pager: [phone]    This patient was seen within 30 minutes of initial consult.  _________________________________________________________     Mart-1 - Negative Histology Text: MART-1 staining demonstrates a normal density and pattern of melanocytes along the dermal-epidermal junction. The surgical margins are negative for tumor cells.